# Patient Record
Sex: MALE | Race: BLACK OR AFRICAN AMERICAN | NOT HISPANIC OR LATINO | Employment: OTHER
[De-identification: names, ages, dates, MRNs, and addresses within clinical notes are randomized per-mention and may not be internally consistent; named-entity substitution may affect disease eponyms.]

---

## 2022-08-09 ENCOUNTER — TRANSCRIBE ORDERS (OUTPATIENT)
Dept: OTHER | Age: 87
End: 2022-08-09

## 2022-08-09 DIAGNOSIS — Z00.00 HEALTHCARE MAINTENANCE: Primary | ICD-10-CM

## 2023-10-27 ENCOUNTER — HOSPITAL ENCOUNTER (INPATIENT)
Facility: CLINIC | Age: 88
LOS: 2 days | Discharge: HOME | DRG: 440 | End: 2023-10-29
Attending: EMERGENCY MEDICINE | Admitting: FAMILY MEDICINE
Payer: MEDICARE

## 2023-10-27 ENCOUNTER — APPOINTMENT (OUTPATIENT)
Dept: ULTRASOUND IMAGING | Facility: CLINIC | Age: 88
DRG: 440 | End: 2023-10-27
Attending: FAMILY MEDICINE
Payer: MEDICARE

## 2023-10-27 ENCOUNTER — APPOINTMENT (OUTPATIENT)
Dept: CT IMAGING | Facility: CLINIC | Age: 88
DRG: 440 | End: 2023-10-27
Payer: MEDICARE

## 2023-10-27 DIAGNOSIS — K85.90 ACUTE PANCREATITIS, UNSPECIFIED COMPLICATION STATUS, UNSPECIFIED PANCREATITIS TYPE: ICD-10-CM

## 2023-10-27 DIAGNOSIS — K81.9 CHOLECYSTITIS: Primary | ICD-10-CM

## 2023-10-27 LAB
ALBUMIN SERPL BCG-MCNC: 4.4 G/DL (ref 3.5–5.2)
ALBUMIN UR-MCNC: NEGATIVE MG/DL
ALP SERPL-CCNC: 88 U/L (ref 40–129)
ALT SERPL W P-5'-P-CCNC: 8 U/L (ref 0–70)
ANION GAP SERPL CALCULATED.3IONS-SCNC: 11 MMOL/L (ref 7–15)
APPEARANCE UR: CLEAR
AST SERPL W P-5'-P-CCNC: 24 U/L (ref 0–45)
ATRIAL RATE - MUSE: 86 BPM
BASOPHILS # BLD AUTO: 0 10E3/UL (ref 0–0.2)
BASOPHILS NFR BLD AUTO: 0 %
BILIRUB DIRECT SERPL-MCNC: <0.2 MG/DL (ref 0–0.3)
BILIRUB SERPL-MCNC: 0.7 MG/DL
BILIRUB UR QL STRIP: NEGATIVE
BUN SERPL-MCNC: 17.7 MG/DL (ref 8–23)
CALCIUM SERPL-MCNC: 9 MG/DL (ref 8.8–10.2)
CHLORIDE SERPL-SCNC: 100 MMOL/L (ref 98–107)
COLOR UR AUTO: COLORLESS
CREAT SERPL-MCNC: 0.86 MG/DL (ref 0.67–1.17)
DEPRECATED HCO3 PLAS-SCNC: 26 MMOL/L (ref 22–29)
DIASTOLIC BLOOD PRESSURE - MUSE: NORMAL MMHG
EGFRCR SERPLBLD CKD-EPI 2021: 83 ML/MIN/1.73M2
EOSINOPHIL # BLD AUTO: 0 10E3/UL (ref 0–0.7)
EOSINOPHIL NFR BLD AUTO: 0 %
ERYTHROCYTE [DISTWIDTH] IN BLOOD BY AUTOMATED COUNT: 13.1 % (ref 10–15)
FLUAV RNA SPEC QL NAA+PROBE: NEGATIVE
FLUBV RNA RESP QL NAA+PROBE: NEGATIVE
GLUCOSE SERPL-MCNC: 116 MG/DL (ref 70–99)
GLUCOSE UR STRIP-MCNC: NEGATIVE MG/DL
HCT VFR BLD AUTO: 44.8 % (ref 40–53)
HGB BLD-MCNC: 15.6 G/DL (ref 13.3–17.7)
HGB UR QL STRIP: NEGATIVE
IMM GRANULOCYTES # BLD: 0.1 10E3/UL
IMM GRANULOCYTES NFR BLD: 1 %
INTERPRETATION ECG - MUSE: NORMAL
KETONES UR STRIP-MCNC: NEGATIVE MG/DL
LEUKOCYTE ESTERASE UR QL STRIP: NEGATIVE
LIPASE SERPL-CCNC: 410 U/L (ref 13–60)
LYMPHOCYTES # BLD AUTO: 1.5 10E3/UL (ref 0.8–5.3)
LYMPHOCYTES NFR BLD AUTO: 8 %
MCH RBC QN AUTO: 29.9 PG (ref 26.5–33)
MCHC RBC AUTO-ENTMCNC: 34.8 G/DL (ref 31.5–36.5)
MCV RBC AUTO: 86 FL (ref 78–100)
MONOCYTES # BLD AUTO: 1 10E3/UL (ref 0–1.3)
MONOCYTES NFR BLD AUTO: 5 %
NEUTROPHILS # BLD AUTO: 16.4 10E3/UL (ref 1.6–8.3)
NEUTROPHILS NFR BLD AUTO: 86 %
NITRATE UR QL: NEGATIVE
NRBC # BLD AUTO: 0 10E3/UL
NRBC BLD AUTO-RTO: 0 /100
P AXIS - MUSE: 73 DEGREES
PH UR STRIP: 6.5 [PH] (ref 5–7)
PLATELET # BLD AUTO: 185 10E3/UL (ref 150–450)
POTASSIUM SERPL-SCNC: 3.5 MMOL/L (ref 3.4–5.3)
PR INTERVAL - MUSE: 202 MS
PROT SERPL-MCNC: 8.2 G/DL (ref 6.4–8.3)
QRS DURATION - MUSE: 88 MS
QT - MUSE: 342 MS
QTC - MUSE: 409 MS
R AXIS - MUSE: 85 DEGREES
RBC # BLD AUTO: 5.22 10E6/UL (ref 4.4–5.9)
RBC URINE: <1 /HPF
RSV RNA SPEC NAA+PROBE: NEGATIVE
SARS-COV-2 RNA RESP QL NAA+PROBE: NEGATIVE
SODIUM SERPL-SCNC: 137 MMOL/L (ref 135–145)
SP GR UR STRIP: 1.01 (ref 1–1.03)
SYSTOLIC BLOOD PRESSURE - MUSE: NORMAL MMHG
T AXIS - MUSE: 4 DEGREES
TROPONIN T SERPL HS-MCNC: 10 NG/L
TROPONIN T SERPL HS-MCNC: 11 NG/L
UROBILINOGEN UR STRIP-MCNC: <2 MG/DL
VENTRICULAR RATE- MUSE: 86 BPM
WBC # BLD AUTO: 19.1 10E3/UL (ref 4–11)
WBC URINE: <1 /HPF

## 2023-10-27 PROCEDURE — 85025 COMPLETE CBC W/AUTO DIFF WBC: CPT

## 2023-10-27 PROCEDURE — 36415 COLL VENOUS BLD VENIPUNCTURE: CPT

## 2023-10-27 PROCEDURE — 258N000003 HC RX IP 258 OP 636: Performed by: FAMILY MEDICINE

## 2023-10-27 PROCEDURE — 99223 1ST HOSP IP/OBS HIGH 75: CPT | Performed by: FAMILY MEDICINE

## 2023-10-27 PROCEDURE — 87637 SARSCOV2&INF A&B&RSV AMP PRB: CPT

## 2023-10-27 PROCEDURE — 76705 ECHO EXAM OF ABDOMEN: CPT

## 2023-10-27 PROCEDURE — 82248 BILIRUBIN DIRECT: CPT

## 2023-10-27 PROCEDURE — 81001 URINALYSIS AUTO W/SCOPE: CPT

## 2023-10-27 PROCEDURE — 83690 ASSAY OF LIPASE: CPT

## 2023-10-27 PROCEDURE — 120N000001 HC R&B MED SURG/OB

## 2023-10-27 PROCEDURE — 96360 HYDRATION IV INFUSION INIT: CPT

## 2023-10-27 PROCEDURE — 250N000011 HC RX IP 250 OP 636: Mod: JZ | Performed by: FAMILY MEDICINE

## 2023-10-27 PROCEDURE — 93005 ELECTROCARDIOGRAM TRACING: CPT

## 2023-10-27 PROCEDURE — 99285 EMERGENCY DEPT VISIT HI MDM: CPT | Mod: 25

## 2023-10-27 PROCEDURE — 250N000011 HC RX IP 250 OP 636: Mod: JZ

## 2023-10-27 PROCEDURE — 80053 COMPREHEN METABOLIC PANEL: CPT

## 2023-10-27 PROCEDURE — 84484 ASSAY OF TROPONIN QUANT: CPT

## 2023-10-27 PROCEDURE — G1010 CDSM STANSON: HCPCS

## 2023-10-27 RX ORDER — ONDANSETRON 2 MG/ML
4 INJECTION INTRAMUSCULAR; INTRAVENOUS EVERY 6 HOURS PRN
Status: DISCONTINUED | OUTPATIENT
Start: 2023-10-27 | End: 2023-10-29 | Stop reason: HOSPADM

## 2023-10-27 RX ORDER — OXYCODONE HYDROCHLORIDE 5 MG/1
5-10 TABLET ORAL EVERY 4 HOURS PRN
Status: DISCONTINUED | OUTPATIENT
Start: 2023-10-27 | End: 2023-10-29 | Stop reason: HOSPADM

## 2023-10-27 RX ORDER — ONDANSETRON 4 MG/1
4 TABLET, ORALLY DISINTEGRATING ORAL EVERY 6 HOURS PRN
Status: DISCONTINUED | OUTPATIENT
Start: 2023-10-27 | End: 2023-10-29 | Stop reason: HOSPADM

## 2023-10-27 RX ORDER — HYDRALAZINE HYDROCHLORIDE 20 MG/ML
10 INJECTION INTRAMUSCULAR; INTRAVENOUS EVERY 6 HOURS PRN
Status: DISCONTINUED | OUTPATIENT
Start: 2023-10-27 | End: 2023-10-29 | Stop reason: HOSPADM

## 2023-10-27 RX ORDER — MORPHINE SULFATE 2 MG/ML
1-2 INJECTION, SOLUTION INTRAMUSCULAR; INTRAVENOUS EVERY 4 HOURS PRN
Status: DISCONTINUED | OUTPATIENT
Start: 2023-10-27 | End: 2023-10-29 | Stop reason: HOSPADM

## 2023-10-27 RX ORDER — HEPARIN SODIUM 5000 [USP'U]/.5ML
5000 INJECTION, SOLUTION INTRAVENOUS; SUBCUTANEOUS EVERY 12 HOURS
Status: DISCONTINUED | OUTPATIENT
Start: 2023-10-27 | End: 2023-10-29 | Stop reason: HOSPADM

## 2023-10-27 RX ORDER — ACETAMINOPHEN 650 MG/1
650 SUPPOSITORY RECTAL EVERY 6 HOURS PRN
Status: DISCONTINUED | OUTPATIENT
Start: 2023-10-27 | End: 2023-10-29 | Stop reason: HOSPADM

## 2023-10-27 RX ORDER — IOPAMIDOL 755 MG/ML
100 INJECTION, SOLUTION INTRAVASCULAR ONCE
Status: DISCONTINUED | OUTPATIENT
Start: 2023-10-27 | End: 2023-10-27

## 2023-10-27 RX ORDER — SODIUM CHLORIDE, SODIUM LACTATE, POTASSIUM CHLORIDE, CALCIUM CHLORIDE 600; 310; 30; 20 MG/100ML; MG/100ML; MG/100ML; MG/100ML
INJECTION, SOLUTION INTRAVENOUS CONTINUOUS
Status: DISCONTINUED | OUTPATIENT
Start: 2023-10-27 | End: 2023-10-28

## 2023-10-27 RX ORDER — IOPAMIDOL 755 MG/ML
90 INJECTION, SOLUTION INTRAVASCULAR ONCE
Status: COMPLETED | OUTPATIENT
Start: 2023-10-27 | End: 2023-10-27

## 2023-10-27 RX ORDER — ACETAMINOPHEN 325 MG/1
650 TABLET ORAL EVERY 6 HOURS PRN
Status: DISCONTINUED | OUTPATIENT
Start: 2023-10-27 | End: 2023-10-29 | Stop reason: HOSPADM

## 2023-10-27 RX ADMIN — SODIUM CHLORIDE, POTASSIUM CHLORIDE, SODIUM LACTATE AND CALCIUM CHLORIDE: 600; 310; 30; 20 INJECTION, SOLUTION INTRAVENOUS at 21:34

## 2023-10-27 RX ADMIN — HEPARIN SODIUM 5000 UNITS: 5000 INJECTION, SOLUTION INTRAVENOUS; SUBCUTANEOUS at 21:34

## 2023-10-27 RX ADMIN — IOPAMIDOL 90 ML: 755 INJECTION, SOLUTION INTRAVENOUS at 18:18

## 2023-10-27 ASSESSMENT — ENCOUNTER SYMPTOMS
FREQUENCY: 0
SHORTNESS OF BREATH: 0
FEVER: 0
CHILLS: 0
DYSURIA: 1
NAUSEA: 0
ABDOMINAL PAIN: 1
VOMITING: 0

## 2023-10-27 ASSESSMENT — ACTIVITIES OF DAILY LIVING (ADL)
ADLS_ACUITY_SCORE: 35

## 2023-10-27 NOTE — ED PROVIDER NOTES
EMERGENCY DEPARTMENT ENCOUNTER      NAME: Waldemar Parrish  AGE: 88 year old male  YOB: 1935  MRN: [id number]  EVALUATION DATE & TIME: No admission date for patient encounter.    PCP: No primary care provider on file.    ED PROVIDER: Cristela Lane PA-C      Chief Complaint   Patient presents with    Abdominal Pain     FINAL IMPRESSION:  1. Acute pancreatitis, unspecified complication status, unspecified pancreatitis type        ED COURSE & MEDICAL DECISION MAKING:    Pertinent Labs & Imaging studies reviewed. (See chart for details)  88 year old male presents to the Emergency Department for evaluation of generalized abdominal pain that started this morning.  Pain does not radiate to his chest.  No shortness of breath, fever, chills, nausea or vomiting.  Vital signs reviewed patient is hypertensive most likely secondary due to pain.  Afebrile.  On exam, patient has generalized abdominal pain tenderness in all 4 quadrants.  No overlying rash. No chest wall tenderness.    Differential diagnosis includes cholecystitis, pancreatitis, GERD, gastritis, appendicitis, small bowel obstruction, diverticulitis, ACS, pericarditis, myocarditis, dissection, PE, pneumonia, pneumothorax, hemothorax.  UA shows no nitrates or leukocytes.  CBC shows a white blood cell count of 19.1.  Hemoglobin is stable. Basic is reassuring.  Lipase is elevated at 10.  Hepatic is unremarkable.  Initial troponin was 10.  Delta troponin is pending.  EKG showed normal sinus rhythm.  No STEMI.  Influenza, COVID, RSV was negative.  CTA shows an enlargement and ill-defined of the pancreatic head.  Slight haziness around the head and findings probably represent an early acute pancreatitis.  Gallstone bladder is distended.  Thoracic and abdominal aorta negative for distention or aneurysm.  No pulmonary embolism.  Moderate-sized esophageal hiatal hernia. I offered pain medications to the patient but he declined.  Patient and family were educated  on the imaging and lab results.  I spoke about admission with the family.  Patient is agreeable.  All questions answered.  I spoke with the hospitalist who agrees to admit the patient.      ED COURSE:   5:16 PM I saw the patient.   6:51 PM I staffed the patient with Dr. Gaytan.   7:15 PM I rechecked and updated patient on results. Discussed plan for admission and patient and family are agreeable.   7:18 PM I discussed case with Dr. Friend, hospitalist, who accepts the patient for admission.     ED Course as of 10/27/23 1933   Fri Oct 27, 2023   1849 No pmhx, no home meds. This am c/o gen abd pain, bilat upper abd pain on exam.    1850 WBC(!): 19.1   1850 Lipase(!): 410   1850 CTA Chest Abdomen Pelvis w Contrast  + pancreatitis        At the conclusion of the encounter I discussed the results of all of the tests and the disposition. The questions were answered. The patient or family acknowledged understanding and was agreeable with the care plan.     0 minutes of critical care time       Additional Documentation    History:  Supplemental history from: Documented in chart, if applicable  External Record(s) reviewed: Documented in chart, if applicable.    Work Up:  Chart documentation includes differential considered and any EKGs or imaging interpreted by provider.  In additional to work up documented, I considered the following work up: Documented in chart, if applicable.    External consultation:  Discussion of management with another provider: Documented in chart, if applicable    Complicating factors:  Care impacted by chronic illness: N/A  Care affected by social determinants of health: N/A    Disposition considerations: Admit.      MEDICATIONS GIVEN IN THE EMERGENCY:  Medications   iopamidol (ISOVUE-370) solution 90 mL (90 mLs Intravenous $Given 10/27/23 1818)       NEW PRESCRIPTIONS STARTED AT TODAY'S ER VISIT  New Prescriptions    No medications on file           =================================================================    HPI    Patient information was obtained from: Patient    Use of : Serbian  via Language Line        Waldemar Parrish is a 88 year old male with a pertinent history of GERD, cholecystitis, who presents to this ED via walk-in for evaluation of abdominal pain.    Patient reports he developed some mid abdominal pain that started this morning. Pain does not radiate into his chest. He notes associating dysuria. No prior history of this. He has taken Tylenol for his symptoms. Otherwise, he denies any shortness of breath, fever, chills, nausea, vomiting, and urinary frequency. Patient denies any significant medical history. He does not take medications regularly. No allergies to medications. No previous abdominal surgeries. No other complaints at this time.    REVIEW OF SYSTEMS   Review of Systems   Constitutional:  Negative for chills and fever.   Respiratory:  Negative for shortness of breath.    Cardiovascular:  Negative for chest pain.   Gastrointestinal:  Positive for abdominal pain. Negative for nausea and vomiting.   Genitourinary:  Positive for dysuria. Negative for frequency.   All other systems reviewed and are negative.       PAST MEDICAL HISTORY:  History reviewed. No pertinent past medical history.    PAST SURGICAL HISTORY:  History reviewed. No pertinent surgical history.        CURRENT MEDICATIONS:    omeprazole (PRILOSEC) 20 MG capsule        ALLERGIES:  No Known Allergies    FAMILY HISTORY:  History reviewed. No pertinent family history.    SOCIAL HISTORY:   Social History     Socioeconomic History    Marital status:    Tobacco Use    Smoking status: Never   Substance and Sexual Activity    Alcohol use: No    Drug use: No       VITALS:  BP (!) 164/80   Pulse 86   Temp 98.8  F (37.1  C) (Temporal)   Resp 16   SpO2 97%     PHYSICAL EXAM    Physical Exam  Vitals and nursing note reviewed.   Constitutional:        Appearance: Normal appearance.   HENT:      Head: Atraumatic.      Right Ear: External ear normal.      Left Ear: External ear normal.      Nose: Nose normal.      Mouth/Throat:      Mouth: Mucous membranes are moist.   Eyes:      Conjunctiva/sclera: Conjunctivae normal.      Pupils: Pupils are equal, round, and reactive to light.   Cardiovascular:      Rate and Rhythm: Normal rate and regular rhythm.      Pulses: Normal pulses.      Heart sounds: Normal heart sounds. No murmur heard.     No friction rub. No gallop.   Pulmonary:      Effort: Pulmonary effort is normal.      Breath sounds: Normal breath sounds. No wheezing or rales.   Abdominal:      General: Abdomen is flat. Bowel sounds are normal.      Palpations: Abdomen is soft.      Tenderness: There is generalized abdominal tenderness. There is no right CVA tenderness, left CVA tenderness, guarding or rebound.   Musculoskeletal:      Cervical back: Normal range of motion.   Skin:     General: Skin is dry.   Neurological:      Mental Status: He is alert. Mental status is at baseline.   Psychiatric:         Mood and Affect: Mood normal.         Thought Content: Thought content normal.          LAB:  All pertinent labs reviewed and interpreted.  Labs Ordered and Resulted from Time of ED Arrival to Time of ED Departure   BASIC METABOLIC PANEL - Abnormal       Result Value    Sodium 137      Potassium 3.5      Chloride 100      Carbon Dioxide (CO2) 26      Anion Gap 11      Urea Nitrogen 17.7      Creatinine 0.86      GFR Estimate 83      Calcium 9.0      Glucose 116 (*)    LIPASE - Abnormal    Lipase 410 (*)    CBC WITH PLATELETS AND DIFFERENTIAL - Abnormal    WBC Count 19.1 (*)     RBC Count 5.22      Hemoglobin 15.6      Hematocrit 44.8      MCV 86      MCH 29.9      MCHC 34.8      RDW 13.1      Platelet Count 185      % Neutrophils 86      % Lymphocytes 8      % Monocytes 5      % Eosinophils 0      % Basophils 0      % Immature Granulocytes 1      NRBCs per  100 WBC 0      Absolute Neutrophils 16.4 (*)     Absolute Lymphocytes 1.5      Absolute Monocytes 1.0      Absolute Eosinophils 0.0      Absolute Basophils 0.0      Absolute Immature Granulocytes 0.1      Absolute NRBCs 0.0     INFLUENZA A/B, RSV, & SARS-COV2 PCR - Normal    Influenza A PCR Negative      Influenza B PCR Negative      RSV PCR Negative      SARS CoV2 PCR Negative     HEPATIC FUNCTION PANEL - Normal    Protein Total 8.2      Albumin 4.4      Bilirubin Total 0.7      Alkaline Phosphatase 88      AST 24      ALT 8      Bilirubin Direct <0.20     TROPONIN T, HIGH SENSITIVITY - Normal    Troponin T, High Sensitivity 10     ROUTINE UA WITH MICROSCOPIC REFLEX TO CULTURE - Normal    Color Urine Colorless      Appearance Urine Clear      Glucose Urine Negative      Bilirubin Urine Negative      Ketones Urine Negative      Specific Gravity Urine 1.008      Blood Urine Negative      pH Urine 6.5      Protein Albumin Urine Negative      Urobilinogen Urine <2.0      Nitrite Urine Negative      Leukocyte Esterase Urine Negative      RBC Urine <1      WBC Urine <1     TROPONIN T, HIGH SENSITIVITY        RADIOLOGY:  Reviewed all pertinent imaging. Please see official radiology report.  CTA Chest Abdomen Pelvis w Contrast   Final Result   IMPRESSION:   1.  Enlargement and ill definition of the pancreatic head. Slight haziness around the head and findings probably represent early acute interstitial pancreatitis. The gallbladder is distended.      2.  Thoracic and abdominal aorta negative for dissection or aneurysm. No pulmonary embolism.      3.  Moderate-sized esophageal hiatal hernia.             EKG:    Performed at: [facility] Emergency Department. 27-Oct-2023, 17:47:15.  Impression: Sinus rhythm  Rate: 86 bpm  Rhythm: Sinus  Axis: 85  AK Interval: 202  QRS Interval: 88 ms  QTc Interval: 409 ms  ST Changes: None  Comparison: No previous ECGs available  Dr. Gaytan have independently reviewed and  interpreted the EKG(s) documented above.         I, Mag Harrington, am serving as a scribe to document services personally performed by Cristela Lane PA-C, based on my observation and the provider's statements to me. I, Cristela Lane PA-C, attest that Mag Harrington is acting in a scribe capacity, has observed my performance of the services and has documented them in accordance with my direction.    Cristela Lane PA-C  [facility] EMERGENCY ROOM  [address] [address]  [phone]     Cristela Lane PA-C  10/27/23 1936

## 2023-10-27 NOTE — ED TRIAGE NOTES
The patient presents to the ED with abdominal pain that began today. He denies fever, nausea, vomiting or diarrhea. The patient reports he had a soft BM this morning. He is Peruvian speaking and requires and .

## 2023-10-28 LAB
ALBUMIN SERPL BCG-MCNC: 3.7 G/DL (ref 3.5–5.2)
ALP SERPL-CCNC: 69 U/L (ref 40–129)
ALT SERPL W P-5'-P-CCNC: 7 U/L (ref 0–70)
ANION GAP SERPL CALCULATED.3IONS-SCNC: 8 MMOL/L (ref 7–15)
AST SERPL W P-5'-P-CCNC: 18 U/L (ref 0–45)
BILIRUB SERPL-MCNC: 0.7 MG/DL
BUN SERPL-MCNC: 10.6 MG/DL (ref 8–23)
CALCIUM SERPL-MCNC: 9 MG/DL (ref 8.8–10.2)
CHLORIDE SERPL-SCNC: 105 MMOL/L (ref 98–107)
CREAT SERPL-MCNC: 0.76 MG/DL (ref 0.67–1.17)
DEPRECATED HCO3 PLAS-SCNC: 25 MMOL/L (ref 22–29)
EGFRCR SERPLBLD CKD-EPI 2021: 86 ML/MIN/1.73M2
ERYTHROCYTE [DISTWIDTH] IN BLOOD BY AUTOMATED COUNT: 12.9 % (ref 10–15)
GLUCOSE SERPL-MCNC: 82 MG/DL (ref 70–99)
HCT VFR BLD AUTO: 40.2 % (ref 40–53)
HGB BLD-MCNC: 14.1 G/DL (ref 13.3–17.7)
LIPASE SERPL-CCNC: 705 U/L (ref 13–60)
MCH RBC QN AUTO: 29.9 PG (ref 26.5–33)
MCHC RBC AUTO-ENTMCNC: 35.1 G/DL (ref 31.5–36.5)
MCV RBC AUTO: 85 FL (ref 78–100)
PLATELET # BLD AUTO: 149 10E3/UL (ref 150–450)
POTASSIUM SERPL-SCNC: 3.5 MMOL/L (ref 3.4–5.3)
PROT SERPL-MCNC: 6.9 G/DL (ref 6.4–8.3)
RBC # BLD AUTO: 4.71 10E6/UL (ref 4.4–5.9)
SODIUM SERPL-SCNC: 138 MMOL/L (ref 135–145)
WBC # BLD AUTO: 9.4 10E3/UL (ref 4–11)

## 2023-10-28 PROCEDURE — C9113 INJ PANTOPRAZOLE SODIUM, VIA: HCPCS | Mod: JZ | Performed by: FAMILY MEDICINE

## 2023-10-28 PROCEDURE — 120N000001 HC R&B MED SURG/OB

## 2023-10-28 PROCEDURE — 83690 ASSAY OF LIPASE: CPT | Performed by: FAMILY MEDICINE

## 2023-10-28 PROCEDURE — 36415 COLL VENOUS BLD VENIPUNCTURE: CPT | Performed by: FAMILY MEDICINE

## 2023-10-28 PROCEDURE — 80053 COMPREHEN METABOLIC PANEL: CPT | Performed by: FAMILY MEDICINE

## 2023-10-28 PROCEDURE — 85014 HEMATOCRIT: CPT | Performed by: FAMILY MEDICINE

## 2023-10-28 PROCEDURE — 99222 1ST HOSP IP/OBS MODERATE 55: CPT | Performed by: SURGERY

## 2023-10-28 PROCEDURE — 250N000011 HC RX IP 250 OP 636: Mod: JZ | Performed by: FAMILY MEDICINE

## 2023-10-28 PROCEDURE — 258N000003 HC RX IP 258 OP 636: Performed by: FAMILY MEDICINE

## 2023-10-28 PROCEDURE — 99233 SBSQ HOSP IP/OBS HIGH 50: CPT | Performed by: FAMILY MEDICINE

## 2023-10-28 RX ORDER — NALOXONE HYDROCHLORIDE 0.4 MG/ML
0.2 INJECTION, SOLUTION INTRAMUSCULAR; INTRAVENOUS; SUBCUTANEOUS
Status: DISCONTINUED | OUTPATIENT
Start: 2023-10-28 | End: 2023-10-29 | Stop reason: HOSPADM

## 2023-10-28 RX ORDER — SODIUM CHLORIDE, SODIUM LACTATE, POTASSIUM CHLORIDE, CALCIUM CHLORIDE 600; 310; 30; 20 MG/100ML; MG/100ML; MG/100ML; MG/100ML
INJECTION, SOLUTION INTRAVENOUS CONTINUOUS
Status: DISCONTINUED | OUTPATIENT
Start: 2023-10-28 | End: 2023-10-29 | Stop reason: HOSPADM

## 2023-10-28 RX ORDER — LEVOFLOXACIN 5 MG/ML
500 INJECTION, SOLUTION INTRAVENOUS EVERY 24 HOURS
Status: DISCONTINUED | OUTPATIENT
Start: 2023-10-28 | End: 2023-10-29 | Stop reason: HOSPADM

## 2023-10-28 RX ORDER — SODIUM CHLORIDE 9 MG/ML
INJECTION, SOLUTION INTRAVENOUS CONTINUOUS
Status: DISCONTINUED | OUTPATIENT
Start: 2023-10-28 | End: 2023-10-28

## 2023-10-28 RX ORDER — NALOXONE HYDROCHLORIDE 0.4 MG/ML
0.4 INJECTION, SOLUTION INTRAMUSCULAR; INTRAVENOUS; SUBCUTANEOUS
Status: DISCONTINUED | OUTPATIENT
Start: 2023-10-28 | End: 2023-10-29 | Stop reason: HOSPADM

## 2023-10-28 RX ORDER — PIPERACILLIN SODIUM, TAZOBACTAM SODIUM 3; .375 G/15ML; G/15ML
3.38 INJECTION, POWDER, LYOPHILIZED, FOR SOLUTION INTRAVENOUS EVERY 8 HOURS
Status: DISCONTINUED | OUTPATIENT
Start: 2023-10-28 | End: 2023-10-28

## 2023-10-28 RX ADMIN — LEVOFLOXACIN 500 MG: 500 INJECTION, SOLUTION INTRAVENOUS at 14:21

## 2023-10-28 RX ADMIN — SODIUM CHLORIDE, POTASSIUM CHLORIDE, SODIUM LACTATE AND CALCIUM CHLORIDE: 600; 310; 30; 20 INJECTION, SOLUTION INTRAVENOUS at 04:57

## 2023-10-28 RX ADMIN — PANTOPRAZOLE SODIUM 40 MG: 40 INJECTION, POWDER, FOR SOLUTION INTRAVENOUS at 09:35

## 2023-10-28 RX ADMIN — PIPERACILLIN AND TAZOBACTAM 3.38 G: 3; .375 INJECTION, POWDER, LYOPHILIZED, FOR SOLUTION INTRAVENOUS at 09:36

## 2023-10-28 RX ADMIN — SODIUM CHLORIDE, POTASSIUM CHLORIDE, SODIUM LACTATE AND CALCIUM CHLORIDE: 600; 310; 30; 20 INJECTION, SOLUTION INTRAVENOUS at 14:20

## 2023-10-28 ASSESSMENT — ACTIVITIES OF DAILY LIVING (ADL)
ADLS_ACUITY_SCORE: 35
ADLS_ACUITY_SCORE: 37
ADLS_ACUITY_SCORE: 37
ADLS_ACUITY_SCORE: 35
ADLS_ACUITY_SCORE: 37
ADLS_ACUITY_SCORE: 35
ADLS_ACUITY_SCORE: 35

## 2023-10-28 NOTE — PLAN OF CARE
Goal Outcome Evaluation:      Plan of Care Reviewed With: patient, child    Overall Patient Progress: improvingOverall Patient Progress: improving    Outcome Evaluation: Improving, no c/o pain, no n/v, changes made in abx and fluids - see orders.  Labs noted. Family at bedside.  Awaiting a bed.  Education provided re: disease process and rationale for plan of care, states understanding. Mexican  utilized to ensure effective communication and understanding.    ADDENDUM:  Bed 346 ready, family/patient aware and report given to Tor

## 2023-10-28 NOTE — H&P
[facility] MEDICINE ADMISSION HISTORY AND PHYSICAL     Assessment & Plan       Waldemar Parrish is a 88 year old old male with no major health history came with upper abdominal pain since today morning which is progressively getting worse.  Found to have acute interstitial pancreatitis on CT scan, lipase 410.  Started on IV hydration and admitted to the hospital for further evaluation and treatment.    Acute pancreatitis  Etiology unclear  Lipase 410  Abdomen CT-acute interstitial pancreatitis  Abdominal ultrasound  Bowel rest  IV  mL/h    GERD  Started on PPI    Essential hypertension  Was not on antihypertensives  IV hydralazine 10 mg every 6 hours as needed    Code full  DVT prophylaxis  Subcu heparin 5000 unit twice a day  Inpatient admission  Needs to stay in the hospital at least for 2 days for further evaluation and treatment    Chief Complaint Upper abdominal pain     HISTORY     Waldemar Parrish is a 88 year old male with no major health history came with upper abdominal pain since today morning which is progressively getting worse.  He developed sudden onset of upper abdominal pain which is 6 out of 10 on the pain scale.  He has more pain with movement.  No nausea or vomiting.  He only drank milk today.  He is afebrile.  He is a lifelong nonalcoholic.  Not taking any medication.  Lives with family.  Found to have acute interstitial pancreatitis on CT scan, lipase 410.  Started on IV hydration and admitted to the hospital for further evaluation and treatment.  History is provided by the patient and son.    Past Medical History     None    Surgical History   History reviewed. No pertinent surgical history.  Family History    Reviewed.  Noncontributory  Social History      Social History     Tobacco Use    Smoking status: Never   Substance Use Topics    Alcohol use: No    Drug use: No        Allergies   No Known Allergies  Prior to Admission Medications      None      Review of  Systems     A 12 point comprehensive review of systems was negative except as noted above in HPI.    PHYSICAL EXAMINATION       Vitals      Temp:  [98.8  F (37.1  C)] 98.8  F (37.1  C)  Pulse:  [80-86] 80  Resp:  [16] 16  BP: (164-189)/(80-81) 189/81  SpO2:  [97 %] 97 %    Examination     GENERAL:  Alert, appears comfortable, in no acute distress, appears stated age   HEAD:  Normocephalic, without obvious abnormality, atraumatic   EYES:  PERRL, conjunctiva clear,  EOM's intact   NOSE: Nares normal, mucosa normal, no drainage   THROAT: Lips, mucosa,  gums normal, mouth moist   NECK: Supple, symmetrical, trachea midline   BACK:   Symmetric, no curvature, ROM normal   LUNGS:   Clear to auscultation bilaterally, no rales, rhonchi, or wheezing, symmetric chest rise on inhalation, respirations unlabored   CHEST WALL:  No tenderness or deformity   HEART:  Regular rate and rhythm, S1 and S2 normal, no murmur   ABDOMEN:   Soft, non-tender, bowel sounds active no masses, no organomegaly, no rebound or guarding   EXTREMITIES: No  edema    SKIN: No rashes   NEURO: Alert, oriented x 3, moves all four extremities freely, non-focal   PSYCH: Cooperative, behavior is appropriate      Pertinent Lab   Most Recent 3 CBC's:  Recent Labs   Lab Test 10/27/23  1739   WBC 19.1*   HGB 15.6   MCV 86        Most Recent 3 BMP's:  Recent Labs   Lab Test 10/27/23  1739      POTASSIUM 3.5   CHLORIDE 100   CO2 26   BUN 17.7   CR 0.86   ANIONGAP 11   ANTONY 9.0   *     Most Recent 2 LFT's:  Recent Labs   Lab Test 10/27/23  1739   AST 24   ALT 8   ALKPHOS 88   BILITOTAL 0.7         Pertinent Radiology     Radiology Results:   Abdomen CT  1.  Enlargement and ill definition of the pancreatic head. Slight haziness around the head and findings probably represent early acute interstitial pancreatitis. The gallbladder is distended.  2.  Thoracic and abdominal aorta negative for dissection or aneurysm. No pulmonary embolism.  3.   Moderate-sized esophageal hiatal hernia.    EKG Results: Sinus rhythm    Total time spent 70 min      Kalee Friend MD  [facility]   Phone: #[phone]

## 2023-10-28 NOTE — ED NOTES
I, Estephania Gaytan have reviewed the documentation, personally taken the patient's history, performed an exam and agree with the physical finds, diagnosis and management plan.    HPI: 88-year-old otherwise healthy gentleman here with complaint of epigastric abdominal pain today    Physical Exam: Reproducible epigastric abdominal tenderness.  It radiates slightly throughout the upper abdomen on exam.  No reproducible CVA tenderness.  No chest wall tenderness.    MDM: GERD, gastritis, pancreatitis, hepatobiliary pathology, less likely UTI or ureterolithiasis.  Given the language barrier, concern that this may also be intrathoracic pathology, ACS, dissection    ED Course/workup: Laboratory work-up and imaging notable for pancreatitis.  No evidence of gallstones, no home medications that would be causing pancreatitis.  Denies tuberculosis and there is no evidence of TB seen on his chest CT.  Denies alcohol use.      ED Course as of 10/27/23 1943   Fri Oct 27, 2023   1849 No pmhx, no home meds. This am c/o gen abd pain, bilat upper abd pain on exam.    1850 WBC(!): 19.1   1850 Lipase(!): 410   1850 CTA Chest Abdomen Pelvis w Contrast  + pancreatitis        EKG:  EKG individually read and interpreted by myself:  Sinus rhythm, rate 86.  No notable ST or T wave abnormalities.  Normal intervals with QRS 88 QTc 409    Final Diagnosis:     ICD-10-CM    1. Acute pancreatitis, unspecified complication status, unspecified pancreatitis type  K85.90           I personally saw the patient and performed a substantive portion of the visit including all aspects of the medical decision making.     MD Lukas Ovalle Zabrina N, MD  10/27/23 1945

## 2023-10-28 NOTE — CONSULTS
General Surgery Consult    Waldemar Parrish MRN# [id number]     Date of Admission: 10/27/2023    Reason for Consult  Gallstone pancreatitis    History of Present Illness  Patient is a very healthy 88-year-old man who presented to the emergency department with approximately 1 day of upper abdominal pain.  This came on fairly suddenly.  He has never had this happen before.  He denies nausea or vomiting.  He has been afebrile.  He has no alcohol history.  His work-up was consistent with pancreatitis.  He was found to have gallbladder sludge on ultrasound    Past Medical History:  Denies.  Does not take any medication    Past Surgical History:  No prior surgeries    Allergies:   No Known Allergies  Medications:  No current facility-administered medications on file prior to encounter.  No current outpatient medications on file prior to encounter.    Social History:  Social History     Socioeconomic History    Marital status:      Spouse name: Not on file    Number of children: Not on file    Years of education: Not on file    Highest education level: Not on file   Occupational History    Not on file   Tobacco Use    Smoking status: Never    Smokeless tobacco: Not on file   Substance and Sexual Activity    Alcohol use: No    Drug use: No    Sexual activity: Not on file   Other Topics Concern    Not on file   Social History Narrative    Not on file     Social Determinants of Health     Financial Resource Strain: Not on file   Food Insecurity: Not on file   Transportation Needs: Not on file   Physical Activity: Not on file   Stress: Not on file   Social Connections: Not on file   Interpersonal Safety: Not on file   Housing Stability: Not on file     Family History:  History reviewed. No pertinent family history.    ROS:  12 point review negative except as stated in H&P    Exam:  /67 (BP Location: Right arm)   Pulse 72   Temp 99  F (37.2  C) (Oral)   Resp 18   SpO2 97%   General: Alert, interactive, NAD  Resp:  Non-labored breathing  Cardiac: RRR  Abdomen: Mildly distended but soft.  Very minimal tenderness to palpation his upper abdomen    Labs:   Personally reviewed  WBC 19.1 initially 9.4 this morning  Lipase 705  Bilirubin 0.7  Labs otherwise unremarkable.    Imaging:   Personally reviewed  CT:  IMPRESSION:  1.  Enlargement and ill definition of the pancreatic head. Slight haziness around the head and findings probably represent early acute interstitial pancreatitis. The gallbladder is distended.   2.  Thoracic and abdominal aorta negative for dissection or aneurysm. No pulmonary embolism.  3.  Moderate-sized esophageal hiatal hernia.     US:  IMPRESSION:  1.  Gallbladder sludge. No gallstones or biliary dilatation.  2.  Possible hepatic steatosis.    Assessment: 88 year old male presenting with gallstone pancreatitis.  I took some time with the family and patient explaining the pathophysiology of this disease.  I explained that the gallbladder is the source of the blockage that caused the pancreatitis.  This is now past most likely but the patient is at risk for recurrence.  I explained that typically we recommend cholecystectomy before discharge.  The patient states that he still feels quite weak and is not really interested in surgery right now.  I offered the alternative of a close clinic follow-up to further discuss and this was much more amenable to the patient.  I feel the patient clearly understands there is a small amount of risk associated with delay but practically speaking I do not think it is an unreasonable request.    Plan:   -No surgical plans due to patient preference  -Okay to advance diet from surgical standpoint  -We will arrange close follow-up at discharge  -We will follow him peripherally while admitted    Brooks Cali MD  General Surgeon  [facility]  Surgery [address]  [place] [address]  Office: [phone]

## 2023-10-28 NOTE — PLAN OF CARE
"Problem: Pancreatitis  Goal: Fluid and Electrolyte Balance  Outcome: Not Progressing   Goal Outcome Evaluation:  Patient denies pain, nausea, vomiting, and diarrhea. Remains on clear liquid diet (ADAT) r/t refusing to eat or drink anything at this time. Patient states the IV fluids provide \"enough\" hydration during discussion using [place] . RN educated patient on importance of diet transition and patient acknowledged but continued to refuse PO intake.     "

## 2023-10-28 NOTE — PROGRESS NOTES
[facility] MEDICINE PROGRESS NOTE      Identification/Summary: Waldemar Parrish is a 88 year old male with no major health history came with upper abdominal pain for 1 day found to have acute interstitial pancreatitis on CT scan.  Symptoms are improved with IV hydration and bowel rest.  Lipase is still up, 410-->705.  Diet advanced.  Abdominal ultrasound revealed gallbladder sludge.  Acute pancreatitis is likely from cholelithiasis.  Patient is clinically improving.  Evaluated by general surgery.  Will have outpatient cholecystectomy in near future.  Anticipated discharge in 1 day    Assessment and Plan:  Acute pancreatitis secondary to cholelithiasis   Lipase 410-->705  Abdomen CT-acute interstitial pancreatitis  Abdominal ultrasound--gallbladder sludge  Decrease IV fluid rate at 125 mL/h  Clear liquid diet    Cholelithiasis without cholecystitis  Leukocytosis 19-->9.4  IV Levaquin     GERD  Started on PPI     Essential hypertension  Was not on antihypertensives  IV hydralazine 10 mg every 6 hours as needed     Code full  DVT prophylaxis  Subcu heparin 5000 unit twice a day    Barrier to discharge  Awaiting for more clinical improvement.  Anticipated discharge in 1 day      Kalee Friend MD  [facility] Medicine  [facility]  Phone: #[phone]  Securely message with the Vocera Web Console (learn more here)  Text page via [address] Paging/Directory     Interval History/Subjective:  Resting comfortably.  Abdominal pain is almost resolved.  Afebrile.  No other concern.    Physical Exam/Objective:  Temp:  [98.7  F (37.1  C)-99  F (37.2  C)] 99  F (37.2  C)  Pulse:  [72-86] 72  Resp:  [16-18] 18  BP: (135-189)/(67-81) 137/67  SpO2:  [96 %-99 %] 97 %  There is no height or weight on file to calculate BMI.    GENERAL:  Alert, appears comfortable, in no acute distress, appears stated age   HEAD:  Normocephalic, without obvious abnormality, atraumatic   EYES:   PERRL, conjunctiva clear,   EOM's intact   NOSE: Nares normal,  mucosa normal, no drainage   THROAT: Lips, mucosa, gums normal, mouth moist   NECK: Supple, symmetrical, trachea midline   BACK:   Symmetric, no curvature, ROM normal   LUNGS:   Clear to auscultation bilaterally, no rales, rhonchi, or wheezing, symmetric chest rise on inhalation, respirations unlabored   CHEST WALL:  No tenderness or deformity   HEART:  Regular rate and rhythm, S1 and S2 normal, no murmur     ABDOMEN:   Soft, epigastric tenderness-resolved, bowel sounds active , no masses, no organomegaly, no rebound or guarding   EXTREMITIES: No  edema    SKIN: No rashes   NEURO: Alert, oriented x3, moves all four extremities freely   PSYCH: Cooperative, behavior is appropriate      Data reviewed today: I personally reviewed all new medications, labs, imaging/diagnostics reports over the past 24 hours. Pertinent findings include:    Imaging:   Abdomen CT  1.  Enlargement and ill definition of the pancreatic head. Slight haziness around the head and findings probably represent early acute interstitial pancreatitis. The gallbladder is distended.  2.  Thoracic and abdominal aorta negative for dissection or aneurysm. No pulmonary embolism.  3.  Moderate-sized esophageal hiatal hernia.    Abdomen US  1.  Gallbladder sludge. No gallstones or biliary dilatation.  2.  Possible hepatic steatosis.    Labs:  Most Recent 3 CBC's:  Recent Labs   Lab Test 10/28/23  1111 10/27/23  1739   WBC 9.4 19.1*   HGB 14.1 15.6   MCV 85 86   * 185     Most Recent 3 BMP's:  Recent Labs   Lab Test 10/28/23  1111 10/27/23  1739    137   POTASSIUM 3.5 3.5   CHLORIDE 105 100   CO2 25 26   BUN 10.6 17.7   CR 0.76 0.86   ANIONGAP 8 11   ANTONY 9.0 9.0   GLC 82 116*       Medications:   Personally Reviewed.  Medications    sodium chloride        [Held by provider] heparin ANTICOAGULANT  5,000 Units Subcutaneous Q12H    pantoprazole  40 mg Intravenous Daily with breakfast     piperacillin-tazobactam  3.375 g Intravenous Q8H      Total time spent 50 min

## 2023-10-28 NOTE — PLAN OF CARE
Problem: Adult Inpatient Plan of Care  Goal: Optimal Comfort and Wellbeing  Outcome: Progressing     Problem: Pancreatitis  Goal: Fluid and Electrolyte Balance  Outcome: Progressing   Goal Outcome Evaluation:    Pleasant gentleman. Ambulates with SBA to independent, tolerates well. LR infusing at 150ml/hr. Remains on room air. VSS. Denies pain tonight.

## 2023-10-28 NOTE — PHARMACY-ADMISSION MEDICATION HISTORY
Pharmacist Admission Medication History    Admission medication history is complete. The information provided in this note is only as accurate as the sources available at the time of the update.    Information Source(s): Patient, Family member, and CareEverywhere/SureScripts via in-person    Pertinent Information: Family members available to interpret and provide history.    Changes made to PTA medication list:  Added: None  Deleted: Omeprazole  Changed: None    Medication Affordability:       Allergies reviewed with patient and updates made in EHR: yes    Medication History Completed By: Mary Ellen Delong PharmD 10/27/2023 7:53 PM    Prior to Admission medications    Not on File

## 2023-10-29 VITALS
DIASTOLIC BLOOD PRESSURE: 76 MMHG | BODY MASS INDEX: 20.68 KG/M2 | SYSTOLIC BLOOD PRESSURE: 136 MMHG | TEMPERATURE: 97.9 F | WEIGHT: 147.71 LBS | RESPIRATION RATE: 18 BRPM | OXYGEN SATURATION: 94 % | HEIGHT: 71 IN | HEART RATE: 76 BPM

## 2023-10-29 LAB
ALBUMIN SERPL BCG-MCNC: 3.6 G/DL (ref 3.5–5.2)
ALP SERPL-CCNC: 68 U/L (ref 40–129)
ALT SERPL W P-5'-P-CCNC: 5 U/L (ref 0–70)
ANION GAP SERPL CALCULATED.3IONS-SCNC: 10 MMOL/L (ref 7–15)
AST SERPL W P-5'-P-CCNC: 22 U/L (ref 0–45)
BILIRUB SERPL-MCNC: 0.7 MG/DL
BUN SERPL-MCNC: 9.3 MG/DL (ref 8–23)
CALCIUM SERPL-MCNC: 9.1 MG/DL (ref 8.8–10.2)
CHLORIDE SERPL-SCNC: 104 MMOL/L (ref 98–107)
CREAT SERPL-MCNC: 0.76 MG/DL (ref 0.67–1.17)
DEPRECATED HCO3 PLAS-SCNC: 21 MMOL/L (ref 22–29)
EGFRCR SERPLBLD CKD-EPI 2021: 86 ML/MIN/1.73M2
GLUCOSE SERPL-MCNC: 96 MG/DL (ref 70–99)
POTASSIUM SERPL-SCNC: 3.9 MMOL/L (ref 3.4–5.3)
PROT SERPL-MCNC: 7 G/DL (ref 6.4–8.3)
SODIUM SERPL-SCNC: 135 MMOL/L (ref 135–145)

## 2023-10-29 PROCEDURE — 250N000011 HC RX IP 250 OP 636: Mod: JZ | Performed by: FAMILY MEDICINE

## 2023-10-29 PROCEDURE — 99239 HOSP IP/OBS DSCHRG MGMT >30: CPT | Performed by: INTERNAL MEDICINE

## 2023-10-29 PROCEDURE — C9113 INJ PANTOPRAZOLE SODIUM, VIA: HCPCS | Mod: JZ | Performed by: FAMILY MEDICINE

## 2023-10-29 PROCEDURE — 258N000003 HC RX IP 258 OP 636: Performed by: FAMILY MEDICINE

## 2023-10-29 PROCEDURE — 80053 COMPREHEN METABOLIC PANEL: CPT | Performed by: INTERNAL MEDICINE

## 2023-10-29 PROCEDURE — 36415 COLL VENOUS BLD VENIPUNCTURE: CPT | Performed by: INTERNAL MEDICINE

## 2023-10-29 RX ORDER — ACETAMINOPHEN 325 MG/1
650 TABLET ORAL EVERY 6 HOURS PRN
Qty: 20 TABLET | Refills: 0 | Status: SHIPPED | OUTPATIENT
Start: 2023-10-29

## 2023-10-29 RX ADMIN — SODIUM CHLORIDE, POTASSIUM CHLORIDE, SODIUM LACTATE AND CALCIUM CHLORIDE: 600; 310; 30; 20 INJECTION, SOLUTION INTRAVENOUS at 00:04

## 2023-10-29 RX ADMIN — PANTOPRAZOLE SODIUM 40 MG: 40 INJECTION, POWDER, FOR SOLUTION INTRAVENOUS at 07:56

## 2023-10-29 ASSESSMENT — ACTIVITIES OF DAILY LIVING (ADL)
ADLS_ACUITY_SCORE: 37

## 2023-10-29 NOTE — PLAN OF CARE
Pt alert and oriented xs4. Able to make needs known via call light. Urinal at bedside for use. Educated pt, through family member, that he could eat, clears diet at this time, and advance his diet slowly. Chicken broth for this morning. LR running at 125. Audible bowel sounds. Denies pain. VSS.    Problem: Risk for Delirium  Goal: Improved Sleep  Outcome: Progressing     Problem: Pancreatitis  Goal: Optimal Nutrition Delivery  Outcome: Progressing  Goal: Optimal Pain Control and Function  Outcome: Progressing

## 2023-10-29 NOTE — PROGRESS NOTES
Care Management Follow Up    Length of Stay (days): 2    Expected Discharge Date: 10/29/2023     Concerns to be Addressed: no discharge needs identified     Patient plan of care discussed at interdisciplinary rounds: Yes    Anticipated Discharge Disposition: Home     Anticipated Discharge Services: None  Anticipated Discharge DME: None    Patient/family educated on Medicare website which has current facility and service quality ratings:    Education Provided on the Discharge Plan: Yes  Patient/Family in Agreement with the Plan:      Referrals Placed by CM/SW:    Private pay costs discussed: Not applicable    Additional Information:    Chart reviewed. Anticipate pt will discharge home, family transport, OP followup.    Pedro Vargas RN

## 2023-10-29 NOTE — DISCHARGE SUMMARY
DISCHARGE    Patient appropriate for discharge. PIV was removed. AVS, prescriptions, and follow-up education reviewed. Pain is denied. Patient ambulates with SBA and personal cane. All belongings remain with patient. Patient left the unit at 1500 with staff. Transportation to home with daughter.    Rica Doe RN

## 2023-10-29 NOTE — DISCHARGE SUMMARY
[facility] MEDICINE  DISCHARGE SUMMARY     Primary Care Physician: [place]  Admission Date: 10/27/2023   Discharge Provider: Nicole Ku MD Discharge Date: 10/29/2023   Diet: Orders Placed This Encounter      Regular Diet Adult      Diet      Code Status: Full Code   Activity: activity as tolerated   [facility]      Condition at Discharge: Stable      REASON FOR PRESENTATION(See Admission Note for Details)   Abdominal pain    PRINCIPAL & ACTIVE DISCHARGE DIAGNOSES     Principal Problem:    Acute gallstone pancreatitis, unspecified complication status, unspecified pancreatitis type  Symptomatic cholelithiasis  Leukocytosis resolved  Hypertension  History of GERD   Hiatal hernia  Hepatic steatosis  Right thyroid nodule    SIGNIFICANT FINDINGS (Imaging, labs):     Most Recent 3 CBC's:  Recent Labs   Lab Test 10/28/23  1111 10/27/23  1739   WBC 9.4 19.1*   HGB 14.1 15.6   MCV 85 86   * 185      Most Recent 3 BMP's:  Recent Labs   Lab Test 10/29/23  0900 10/28/23  1111 10/27/23  1739    138 137   POTASSIUM 3.9 3.5 3.5   CHLORIDE 104 105 100   CO2 21* 25 26   BUN 9.3 10.6 17.7   CR 0.76 0.76 0.86   ANIONGAP 10 8 11   ANTONY 9.1 9.0 9.0   GLC 96 82 116*     Most Recent 2 LFT's:  Recent Labs   Lab Test 10/29/23  0900 10/28/23  1111   AST 22 18   ALT 5 7   ALKPHOS 68 69   BILITOTAL 0.7 0.7     Most Recent INR's and Anticoagulation Dosing History:  Anticoagulation Dose History           No data to display              Most Recent 3 Troponin's:No lab results found.  Most Recent Cholesterol Panel:No lab results found.  Most Recent 6 Bacteria Isolates From Any Culture (See EPIC Reports for Culture Details):No lab results found.  Most Recent TSH, T4 and A1c Labs:No lab results found.  Results for orders placed or performed during the hospital encounter of 10/27/23   CTA Chest Abdomen Pelvis w Contrast    Narrative    EXAM: CTA CHEST ABDOMEN PELVIS W CONTRAST  LOCATION: [facility]  DATE: 10/27/2023    INDICATION: Chest and abdominal pain.    COMPARISON: None.    TECHNIQUE: CT angiogram chest, abdomen and pelvis during arterial phase of injection of IV contrast. 2D and 3D MIP reconstructions were performed by the CT technologist. Dose reduction techniques were used.     CONTRAST: 90 mL Isovue 370.    FINDINGS:   CT ANGIOGRAM CHEST, ABDOMEN AND PELVIS: No pulmonary embolism. Thoracic aorta is negative for dissection or aneurysm.    LUNGS AND PLEURA: Normal.    MEDIASTINUM/AXILLAE: Tiny hypodense nodule right thyroid lobe. Moderate-sized esophageal hiatal hernia.    CORONARY ARTERY CALCIFICATION: Mild.    HEPATOBILIARY: Gallbladder is distended.    PANCREAS: There is mild enlargement and ill definition of the pancreatic head. Very minimal haziness and findings likely represent early acute interstitial pancreatitis.    SPLEEN: Normal.    ADRENAL GLANDS: Normal.    KIDNEYS/BLADDER: Normal.    BOWEL: Normal.    LYMPH NODES: Normal.    PELVIC ORGANS: Normal.    MUSCULOSKELETAL: Mild lumbar degenerative change.      Impression    IMPRESSION:  1.  Enlargement and ill definition of the pancreatic head. Slight haziness around the head and findings probably represent early acute interstitial pancreatitis. The gallbladder is distended.    2.  Thoracic and abdominal aorta negative for dissection or aneurysm. No pulmonary embolism.    3.  Moderate-sized esophageal hiatal hernia.     US Abdomen Limited    Narrative    EXAM: US ABDOMEN LIMITED  LOCATION: [facility]  DATE: 10/27/2023    INDICATION: ruq pain  COMPARISON: CT 10/27/2023.  TECHNIQUE: Limited abdominal ultrasound.    FINDINGS:    GALLBLADDER: Gallbladder sludge. No gallstones identified. Mild gallbladder distention. No significant gallbladder wall thickening. Negative sonographic Ames's sign.    BILE DUCTS: No biliary dilatation. The common duct measures 4 mm.    LIVER: Mildly echogenic  liver parenchyma. No focal mass.    RIGHT KIDNEY: No hydronephrosis.    PANCREAS: The pancreas is largely obscured by overlying gas.    No ascites.      Impression    IMPRESSION:  1.  Gallbladder sludge. No gallstones or biliary dilatation.  2.  Possible hepatic steatosis.            PENDING LABS         PROCEDURES ( this hospitalization only)          RECOMMENDATION FOR F/U VISIT       DISPOSITION     Home    SUMMARY OF HOSPITAL COURSE:    50-year-old male with past medical history significant for hypertension, GERD presented to the emergency room with complaints of upper abdominal pain.  Work-up was consistent with pancreatitis.  Ultrasound showed gallbladder sludge.  Has pancreatitis from gallstones.  He was treated with bowel rest IV fluids and pain control.  He was seen by general surgery, was recommended to have cholecystectomy, patient refused to have surgery at this time.  Her leukocytosis on admission.  Discharging on 5 days of Augmentin.  He will follow-up with the surgery as outpatient if recurrent pain in the future.  Doing well at the time of discharge.  Tolerated a low-fat diet.  Discharged home in stable condition.    Discharge Medications with Med changes:        Review of your medicines        START taking        Dose / Directions   acetaminophen 325 MG tablet  Commonly known as: TYLENOL      Dose: 650 mg  Take 2 tablets (650 mg) by mouth every 6 hours as needed for mild pain or other (and adjunct with moderate or severe pain or per patient request)  Quantity: 20 tablet  Refills: 0     amoxicillin-clavulanate 875-125 MG tablet  Commonly known as: AUGMENTIN  Used for: Cholecystitis      Dose: 1 tablet  Take 1 tablet by mouth 2 times daily for 5 days  Quantity: 10 tablet  Refills: 0               Where to get your medicines        These medications were sent to [id number] - [place], [place] - [address]  [address], [place] [facility] [place] [address]      Phone: [phone]   acetaminophen 325  MG tablet  amoxicillin-clavulanate 875-125 MG tablet              Rationale for medication changes:    Augmentin or possible cholecystitis        Consults   surgery      Immunizations given this encounter     [unfilled]      Discharge Procedure Orders   Reason for your hospital stay   Order Comments: Gall bladder stones, pancreas inflammation     Follow-up and recommended labs and tests    Order Comments: Follow up with primary care provider, [facility], within 7 days    Follow up with surgery as needed     Activity   Order Comments: Your activity upon discharge: activity as tolerated     Order Specific Question Answer Comments   Is discharge order? Yes      Diet   Order Comments: Follow this diet upon discharge: Orders Placed This Encounter      Low fat diet     Order Specific Question Answer Comments   Is discharge order? Yes      Examination     Vital Signs in last 24 hours:   Vital signs:  Temp: 97.9  F (36.6  C) Temp src: Oral BP: 136/76 Pulse: 76   Resp: 18 SpO2: 94 % O2 Device: None (Room air)     Weight: 67 kg (147 lb 11.3 oz)  Estimated body mass index is 20.03 kg/m  as calculated from the following:    Height as of 10/11/16: 1.829 m (6').    Weight as of this encounter: 67 kg (147 lb 11.3 oz).       Pertinent positives on exam:  Abdomen: soft, Non tender, bowel and present no guarding or rigidity.    Please see EMR for more detailed significant labs, imaging, consultant notes etc.  Total time spent on discharge: > 35 minutes    Nicole Ku MD   [facility] Hospitalist Service: Ph:[phone]    CC:[facility], [facility]

## 2023-10-29 NOTE — PROGRESS NOTES
Patient A&O x4 and pleasant. VSS on RA. Pain is denied. Patient ambulates independently in room. PIV has been removed. Tolerating oral intake and voiding appropriately. Patient up in chair. Patient is safe, will continue to monitor.    Rica Doe RN

## 2023-10-29 NOTE — PLAN OF CARE
Problem: Adult Inpatient Plan of Care  Goal: Optimal Comfort and Wellbeing  Outcome: Progressing      Pt A&Ox4, VSS on RA. Patient denies pain, nausea, vomiting, and diarrhea. Remains on clear liquid diet, pt continues to refuse to eat or drink anything at this time. IV hydration provided. Pt appears to be resting comfortably between cares. Pt uses urinal, voiding spontaneously. Bed alarm on for safety.

## 2023-10-30 ENCOUNTER — PATIENT OUTREACH (OUTPATIENT)
Dept: CARE COORDINATION | Facility: CLINIC | Age: 88
End: 2023-10-30
Payer: MEDICARE

## 2023-10-30 NOTE — PROGRESS NOTES
"Clinic Care Coordination Contact  [facility]: Post-Discharge Note  SITUATION                                                      Admission:    Admission Date: 10/27/23   Reason for Admission: Acute pancreatitis, unspecified complication status, unspecified pancreatitis type  Discharge:   Discharge Date: 10/29/23  Discharge Diagnosis: Acute pancreatitis, unspecified complication status, unspecified pancreatitis type  Acute pancreatitis secondary to cholelithiasis    BACKGROUND                                                      Per hospital discharge summary and inpatient provider notes:    Waldemar Parrish is a 88 year old old male with no major health history came with upper abdominal pain since today morning which is progressively getting worse.  Found to have acute interstitial pancreatitis on CT scan, lipase 410.  Started on IV hydration and admitted to the hospital for further evaluation and treatment.     ASSESSMENT           Discharge Assessment  How are you doing now that you are home?: \"I'm doing good, no pain; I picked up the medication they gave me yesterday and taking it in the morning and night like they told me for 5 days.\"  How are your symptoms? (Red Flag symptoms escalate to triage hotline per guidelines): Improved  Do you feel your condition is stable enough to be safe at home until your provider visit?: Yes  Does the patient have their discharge instructions? : Yes  Does the patient have questions regarding their discharge instructions? : No  Were you started on any new medications or were there changes to any of your previous medications? : Yes  Does the patient have all of their medications?: Yes  Do you have questions regarding any of your medications? : No  Do you have all of your needed medical supplies or equipment (DME)?  (i.e. oxygen tank, CPAP, cane, etc.): Yes  Discharge follow-up appointment scheduled within 14 calendar days? : No  Is patient agreeable to assistance with " scheduling? : No (Pt states they will schedule a follow up appt with their clinic on their own)         Post-op (Clinicians Only)  Did the patient have surgery or a procedure: No      PLAN                                                      Outpatient Plan:      Follow-up and recommended labs and tests  Follow up with primary care provider, [facility], within 7 days  Follow up with surgery as needed    No future appointments.      For any urgent concerns, please contact our 24 hour nurse triage line: [phone] ([phone])         Vanessa Reddy RN